# Patient Record
Sex: MALE | Race: WHITE | NOT HISPANIC OR LATINO
[De-identification: names, ages, dates, MRNs, and addresses within clinical notes are randomized per-mention and may not be internally consistent; named-entity substitution may affect disease eponyms.]

---

## 2023-07-04 ENCOUNTER — NON-APPOINTMENT (OUTPATIENT)
Age: 43
End: 2023-07-04

## 2023-07-05 PROBLEM — Z00.00 ENCOUNTER FOR PREVENTIVE HEALTH EXAMINATION: Status: ACTIVE | Noted: 2023-07-05

## 2023-07-12 ENCOUNTER — NON-APPOINTMENT (OUTPATIENT)
Age: 43
End: 2023-07-12

## 2023-07-12 ENCOUNTER — APPOINTMENT (OUTPATIENT)
Dept: FAMILY MEDICINE | Facility: CLINIC | Age: 43
End: 2023-07-12
Payer: COMMERCIAL

## 2023-07-12 ENCOUNTER — APPOINTMENT (OUTPATIENT)
Dept: ORTHOPEDIC SURGERY | Facility: CLINIC | Age: 43
End: 2023-07-12
Payer: COMMERCIAL

## 2023-07-12 VITALS
RESPIRATION RATE: 16 BRPM | HEART RATE: 91 BPM | HEIGHT: 70 IN | DIASTOLIC BLOOD PRESSURE: 80 MMHG | WEIGHT: 174 LBS | BODY MASS INDEX: 24.91 KG/M2 | SYSTOLIC BLOOD PRESSURE: 138 MMHG

## 2023-07-12 VITALS — BODY MASS INDEX: 25.77 KG/M2 | WEIGHT: 180 LBS | HEIGHT: 70 IN

## 2023-07-12 DIAGNOSIS — Z11.59 ENCOUNTER FOR SCREENING FOR OTHER VIRAL DISEASES: ICD-10-CM

## 2023-07-12 DIAGNOSIS — Z78.9 OTHER SPECIFIED HEALTH STATUS: ICD-10-CM

## 2023-07-12 DIAGNOSIS — M77.8 OTHER ENTHESOPATHIES, NOT ELSEWHERE CLASSIFIED: ICD-10-CM

## 2023-07-12 DIAGNOSIS — Z11.4 ENCOUNTER FOR SCREENING FOR HUMAN IMMUNODEFICIENCY VIRUS [HIV]: ICD-10-CM

## 2023-07-12 PROCEDURE — 99204 OFFICE O/P NEW MOD 45 MIN: CPT | Mod: 25

## 2023-07-12 PROCEDURE — 73130 X-RAY EXAM OF HAND: CPT | Mod: RT

## 2023-07-12 PROCEDURE — 20550 NJX 1 TENDON SHEATH/LIGAMENT: CPT | Mod: RT

## 2023-07-12 PROCEDURE — 73080 X-RAY EXAM OF ELBOW: CPT | Mod: RT

## 2023-07-12 NOTE — HISTORY OF PRESENT ILLNESS
[Sudden] : sudden [10] : 10 [Dull/Aching] : dull/aching [Radiating] : radiating [Constant] : constant [Sleep] : sleep [Nothing helps with pain getting better] : Nothing helps with pain getting better [de-identified] : 7/12/23: 43yo RHD male (william) presents for RIGHT wrist pain and swelling after a fall ~10 years ago. Reports that XR at that time were negative for fracture and MRI was inconclusive. Reports that pain improved with steroid injection and therapy, but has flares of pain and swelling about once a year. Reports that he has had 3 steroid injection, which relieved the pain. 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 Reports that swelling shifted from volar wrist to dorsal hand/wrist after taking medication.\par Denies personal/family history of autoimmune/inflammatory disorder. Denies h/o gout.\par Using Tylenol PRN.\par \par Hx: none. [] : no [FreeTextEntry5] : Rt Hand/Elbow pain/swelling that started 2 weeks ago. Suffered from fall years ago. Tried prednisone course; didn't work.  [FreeTextEntry7] : right arm

## 2023-07-12 NOTE — HISTORY OF PRESENT ILLNESS
[FreeTextEntry1] : Establish care [de-identified] : Mr. YANCY DURAN is a pleasant 42 year old male with no significant PMH who comes in to the office to establish care and for pain in his right wrist and elbow. his pain is intermittent, for about 2 weeks, denies redness, wounds, fever. He had had similar episodes in the past 10 years, 4 times already, has had some steroids oral. He saw ortho today and got a corticosteroid injection and was advised to see a rheumatologist. His symptoms are not worsening. Has not been diagnosed with gout or thyroid or other issues. He eats red meat almost every day. Drinks Smirnoff ice drinks 2 times/week but not beer.

## 2023-07-12 NOTE — HEALTH RISK ASSESSMENT
[2 - 3 times a week (3 pts)] : 2 - 3  times a week (3 points) [1 or 2 (0 pts)] : 1 or 2 (0 points) [Never (0 pts)] : Never (0 points) [Yes] : In the past 12 months have you used drugs other than those required for medical reasons? Yes [0] : 2) Feeling down, depressed, or hopeless: Not at all (0) [PHQ-2 Negative - No further assessment needed] : PHQ-2 Negative - No further assessment needed [HIV Test offered] : HIV Test offered [Hepatitis C test offered] : Hepatitis C test offered [Never] : Never [Audit-CScore] : 2 [de-identified] : marijuana use [JDW2Acbtc] : 0

## 2023-07-12 NOTE — PLAN
[FreeTextEntry1] : \par \par Right hand pain and swelling\par Unclear cause, could be gout vs other autoimmune vs others?\par Right elbow pain with minimal symptoms as noted during physical exam\par Low suspicious for infectious cause but he just got a steroids shot and is very inflamed. WIll give him Keflex\par Sending colchicine\par Xrays done with ortho and per their note, no acute fracture\par Ordering CBC, CMP, uric acid, MARIA TERESA, CCP, RF\par Referring to rheumatology\par Alarm symptoms discussed  including but not limited to worsening pain, redness, fever and I advised the patient to call 911 or to go to the emergency department if these symptoms appear. \par Hand/arm elevation and cold compresses advised\par \par Doing preventive lab work\par \par Will see ortho in 2 weeks\par Return to care: within 1 month for AWE/follow up or sooner should the need arise\par Call or return for any questions

## 2023-07-12 NOTE — PHYSICAL EXAM
[Normal] : normal rate, regular rhythm, normal S1 and S2 and no murmur heard [de-identified] : right hand wrapped, with swelling, No erythema or wound is in exposed skin. Right elbow tenderness but no abnormalities, wounds or erythema noted. Able to extend the elbow. Hand movements limited due to swelling and inflamation

## 2023-07-12 NOTE — IMAGING
[de-identified] : RIGHT ELBOW\par skin intact. no swelling.\par mild TTP to olecranon.\par elbow ROM: good extension, flexion. good pronation, supination. \par \par RIGHT HAND\par skin intact. moderate swelling of wrist and dorsal hand. no erythema.\par TTP diffusely to dorsal wrist.\par wrist ROM: deferred due to pain.\par good EPL, mild limited FPL. good finger extension, very limited flex. good finger abduction and adduction. \par + pain with wrist and digital ROM.\par SILT to median, ulnar, radial distribution. \par palpable radial pulse, brisk cap refill all digits.\par no triggering.\par \par \par XRAYS OF RIGHT ELBOW: no acute displaced fracture or dislocation.\par XRAYS OF RIGHT HAND: no acute displaced fracture or dislocation. djd of radiocarpal and LT joint.

## 2023-07-12 NOTE — ASSESSMENT
[FreeTextEntry1] : The condition was explained to the patient.\par \par Discussed that arthritis is a progressive degenerative process, and symptoms may have a waxing/waning course, which may be exacerbated by activity or trauma. Discussed treatment options - activity modification, bracing, steroid injection, or last resort surgery.\par Discussed increased propensity for stiffness due to underlying arthritis.\par \par Discussed my concern that hand stiffness can cause grave dysfunction and is difficult to overcome once it has set in. \par - encouraged HEP for finger flexion. \par - recommend wrist brace, full time except hygiene.\par - recommend compression glove and elevation of hand/wrist above level of heart for swelling.\par - recommend activity modification and ice PRN.\par \par Discussed risks and benefits of treatment options for tenosynovitis - activity modification, NSAID, splint, steroid injection, or surgery.\par \par Patient requesting CSI for R wrist pain and swelling.\par Patient would like to proceed with CSI.\par - Discussed risks, benefits, and alternatives as well as contents of injection. Risks include, but are not limited allergic reaction, flare reaction, injection site pain, bruising, numbness, increased blood sugar, skin discoloration, fat atrophy, tendon rupture, and infection. Risk of immune suppression and increased susceptibility to infection with steroid use. Patient expressed understanding and would like to proceed with injection.\par - The skin over the RIGHT 4th extensor compartment was cleansed with alcohol/betadine and anesthetized with 0.5cc of 1% lidocaine. The 4th extensor compartment was injected with 6mg of celestone. Site was dressed with a band-aid. Patient tolerated the procedure well.\par \par F/u 2 weeks.\par Also recommend f/u with Rheumatologist to evaluate for autoimmune/inflammatory disorder and gout. Patient reports that he is seeing his PCP today.

## 2023-07-17 LAB
ALBUMIN SERPL ELPH-MCNC: 4.7 G/DL
ALP BLD-CCNC: 85 U/L
ALT SERPL-CCNC: 12 U/L
ANA SER IF-ACNC: NEGATIVE
ANION GAP SERPL CALC-SCNC: 17 MMOL/L
AST SERPL-CCNC: 14 U/L
BILIRUB SERPL-MCNC: 0.7 MG/DL
BUN SERPL-MCNC: 10 MG/DL
CALCIUM SERPL-MCNC: 10.2 MG/DL
CCP AB SER IA-ACNC: <8 UNITS
CHLORIDE SERPL-SCNC: 96 MMOL/L
CO2 SERPL-SCNC: 26 MMOL/L
CREAT SERPL-MCNC: 1.02 MG/DL
EGFR: 94 ML/MIN/1.73M2
GLUCOSE SERPL-MCNC: 98 MG/DL
HCV AB SER QL: NONREACTIVE
HCV S/CO RATIO: 0.09 S/CO
HIV1+2 AB SPEC QL IA.RAPID: NONREACTIVE
POTASSIUM SERPL-SCNC: 4.3 MMOL/L
PROT SERPL-MCNC: 7.6 G/DL
RF+CCP IGG SER-IMP: NEGATIVE
RHEUMATOID FACT SER QL: 12 IU/ML
SODIUM SERPL-SCNC: 139 MMOL/L
TSH SERPL-ACNC: 0.68 UIU/ML
URATE SERPL-MCNC: 11.4 MG/DL

## 2023-07-20 ENCOUNTER — NON-APPOINTMENT (OUTPATIENT)
Age: 43
End: 2023-07-20

## 2023-07-21 ENCOUNTER — NON-APPOINTMENT (OUTPATIENT)
Age: 43
End: 2023-07-21

## 2023-08-09 ENCOUNTER — APPOINTMENT (OUTPATIENT)
Dept: FAMILY MEDICINE | Facility: CLINIC | Age: 43
End: 2023-08-09
Payer: COMMERCIAL

## 2023-08-09 VITALS
DIASTOLIC BLOOD PRESSURE: 85 MMHG | WEIGHT: 180 LBS | SYSTOLIC BLOOD PRESSURE: 124 MMHG | HEART RATE: 49 BPM | BODY MASS INDEX: 26.66 KG/M2 | HEIGHT: 69 IN

## 2023-08-09 PROCEDURE — 99214 OFFICE O/P EST MOD 30 MIN: CPT

## 2023-08-09 RX ORDER — CEPHALEXIN 500 MG/1
500 TABLET ORAL EVERY 6 HOURS
Qty: 20 | Refills: 0 | Status: DISCONTINUED | COMMUNITY
Start: 2023-07-12 | End: 2023-08-09

## 2023-08-09 NOTE — PLAN
[FreeTextEntry1] :  Right hand pain and swelling Likely gout  Negative CCP, MARIA TERESA, RF  Uric acid was 11.4 (07/12/2023) Start allopurinol 100 mg QD and repeat uric acid within 6 weeks Elbow pain resolved, w/rist pain much improved. Ordering official Xrays in case he needs an MRI in the future. He will see ortho within 2 weeks  Renewing colchicine Xrays done with ortho and per their note, no acute fracture. Was referred to rheumatology. Alarm symptoms discussed including but not limited to worsening pain, redness, fever and I advised the patient to call 911 or to go to the emergency department if these symptoms appear.  Hand/arm elevation and cold compresses advised Will see ortho within 2 weeks Can try voltaren gel  Return to care: within 1 month for AWE/follow up or sooner should the need arise Call or return for any questions

## 2023-08-09 NOTE — HEALTH RISK ASSESSMENT
[0] : 2) Feeling down, depressed, or hopeless: Not at all (0) [PHQ-2 Negative - No further assessment needed] : PHQ-2 Negative - No further assessment needed [[id number]] : 0

## 2023-08-09 NOTE — PHYSICAL EXAM
[Normal] : normal rate, regular rhythm, normal S1 and S2 and no murmur heard [de-identified] : mild tenderness in interior aspect of right hand. No erythema or wound is in exposed skin. Right elbow  with no tenderness. Overall symptoms much better and no wounds.  [de-identified] : normal strenght and sensation of bot hands

## 2023-08-09 NOTE — HISTORY OF PRESENT ILLNESS
[FreeTextEntry1] : Follow up [de-identified] : Mr. YANCY DURAN is a pleasant 42 year old male with no significant PMH who comes in to the office to follow up in pain in his right wrist and elbow. His symptoms are better now, after he finished the Colchicine x 2. No pain in his elbow ant this time. His uric acid was elevated. Also he sees hand surgery Dr Holt and will follow up on 08/23/2023  He has been cutting down red meat. Drinks Smirnoff ice drinks 2 times/week but not beer  Hand Surgery: Jonathon

## 2023-08-23 ENCOUNTER — APPOINTMENT (OUTPATIENT)
Dept: ORTHOPEDIC SURGERY | Facility: CLINIC | Age: 43
End: 2023-08-23
Payer: COMMERCIAL

## 2023-08-23 VITALS — BODY MASS INDEX: 26.66 KG/M2 | HEIGHT: 69 IN | WEIGHT: 180 LBS

## 2023-08-23 DIAGNOSIS — M25.531 PAIN IN RIGHT WRIST: ICD-10-CM

## 2023-08-23 DIAGNOSIS — M25.521 PAIN IN RIGHT ELBOW: ICD-10-CM

## 2023-08-23 DIAGNOSIS — M19.039 PRIMARY OSTEOARTHRITIS, UNSPECIFIED WRIST: ICD-10-CM

## 2023-08-23 DIAGNOSIS — M1A.00X0 IDIOPATHIC CHRONIC GOUT, UNSPECIFIED SITE, W/OUT TOPHUS (TOPHI): ICD-10-CM

## 2023-08-23 PROCEDURE — 99213 OFFICE O/P EST LOW 20 MIN: CPT

## 2023-08-23 NOTE — HISTORY OF PRESENT ILLNESS
[Sudden] : sudden [10] : 10 [Dull/Aching] : dull/aching [Radiating] : radiating [Constant] : constant [Sleep] : sleep [Nothing helps with pain getting better] : Nothing helps with pain getting better [de-identified] : 8/23/23: f/u RIGHT wrist arthritis, tendinitis, pain. f/u RIGHT elbow pain. s/p CSI 4th extensor compartment on 7/12/23. Since last visit, saw PCP => labs +gout, prescribed Colchicine and Allopurinol. Reports pain and swelling significantly improved since last visit. c/o intermittent volar radial wrist pain, worse with push-up.  7/12/23: 41yo RHD male (william) presents for RIGHT wrist pain and swelling after a fall ~10 years ago. Reports that XR at that time were negative for fracture and MRI was inconclusive. Reports that pain improved with steroid injection and therapy but has flares of pain and swelling about once a year. Reports that he has had 3 steroid injections, which relieved the pain. 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 Has worn wrist brace intermittently, but unable to tolerate it at this time. Went to [facility] on 7/5/23 => Dx gout => Rx prednisone 20mg x 7 days. Reports that swelling shifted from volar wrist to dorsal hand/wrist after taking medication. Denies personal/family history of autoimmune/inflammatory disorder. Denies h/o gout. Using Tylenol PRN.  Hx: none. [] : no [FreeTextEntry5] : Rt Hand/Elbow pain/swelling that started 2 weeks ago. Suffered from fall years ago. Tried prednisone course; didn't work.  [FreeTextEntry7] : right arm

## 2023-08-23 NOTE — IMAGING
[de-identified] : RIGHT ELBOW skin intact. no swelling. no TTP. elbow ROM: good extension, flexion. good pronation, supination.   RIGHT HAND skin intact. mild swelling of volar radial wrist. no TTP. wrist ROM: very limited extension, flexion. good EPL, FPL. good finger extension, flex to full fist. no scissoring. good finger abduction, adduction.  SILT to median, ulnar, radial distribution.  palpable radial pulse, brisk cap refill all digits. no triggering.

## 2023-09-27 ENCOUNTER — APPOINTMENT (OUTPATIENT)
Dept: RHEUMATOLOGY | Facility: CLINIC | Age: 43
End: 2023-09-27

## 2023-10-11 ENCOUNTER — APPOINTMENT (OUTPATIENT)
Dept: FAMILY MEDICINE | Facility: CLINIC | Age: 43
End: 2023-10-11
Payer: SELF-PAY

## 2023-10-11 VITALS
HEIGHT: 69 IN | WEIGHT: 180 LBS | BODY MASS INDEX: 26.66 KG/M2 | SYSTOLIC BLOOD PRESSURE: 110 MMHG | DIASTOLIC BLOOD PRESSURE: 80 MMHG

## 2023-10-11 DIAGNOSIS — D72.829 ELEVATED WHITE BLOOD CELL COUNT, UNSPECIFIED: ICD-10-CM

## 2023-10-11 DIAGNOSIS — Z71.2 PERSON CONSULTING FOR EXPLANATION OF EXAMINATION OR TEST FINDINGS: ICD-10-CM

## 2023-10-11 DIAGNOSIS — Z23 ENCOUNTER FOR IMMUNIZATION: ICD-10-CM

## 2023-10-11 PROCEDURE — 99214 OFFICE O/P EST MOD 30 MIN: CPT | Mod: 25

## 2023-10-11 RX ORDER — COLCHICINE 0.6 MG/1
0.6 TABLET ORAL
Qty: 14 | Refills: 1 | Status: ACTIVE | COMMUNITY
Start: 2023-07-12 | End: 1900-01-01

## 2023-10-12 LAB
BASOPHILS # BLD AUTO: 0.05 K/UL
BASOPHILS NFR BLD AUTO: 0.7 %
EOSINOPHIL # BLD AUTO: 0.26 K/UL
EOSINOPHIL NFR BLD AUTO: 3.9 %
HCT VFR BLD CALC: 45.3 %
HGB BLD-MCNC: 14.6 G/DL
IMM GRANULOCYTES NFR BLD AUTO: 0.1 %
LYMPHOCYTES # BLD AUTO: 3.65 K/UL
LYMPHOCYTES NFR BLD AUTO: 54.1 %
MAN DIFF?: NORMAL
MCHC RBC-ENTMCNC: 28.9 PG
MCHC RBC-ENTMCNC: 32.2 GM/DL
MCV RBC AUTO: 89.7 FL
MONOCYTES # BLD AUTO: 0.57 K/UL
MONOCYTES NFR BLD AUTO: 8.4 %
NEUTROPHILS # BLD AUTO: 2.21 K/UL
NEUTROPHILS NFR BLD AUTO: 32.8 %
PLATELET # BLD AUTO: 297 K/UL
RBC # BLD: 5.05 M/UL
RBC # FLD: 13.8 %
URATE SERPL-MCNC: 6.5 MG/DL
WBC # FLD AUTO: 6.75 K/UL

## 2024-01-23 ENCOUNTER — APPOINTMENT (OUTPATIENT)
Dept: FAMILY MEDICINE | Facility: CLINIC | Age: 44
End: 2024-01-23
Payer: COMMERCIAL

## 2024-01-23 VITALS
RESPIRATION RATE: 16 BRPM | HEIGHT: 69 IN | HEART RATE: 77 BPM | DIASTOLIC BLOOD PRESSURE: 65 MMHG | BODY MASS INDEX: 26.66 KG/M2 | SYSTOLIC BLOOD PRESSURE: 111 MMHG | WEIGHT: 180 LBS

## 2024-01-23 DIAGNOSIS — E66.3 OVERWEIGHT: ICD-10-CM

## 2024-01-23 DIAGNOSIS — M10.9 GOUT, UNSPECIFIED: ICD-10-CM

## 2024-01-23 DIAGNOSIS — R79.89 OTHER SPECIFIED ABNORMAL FINDINGS OF BLOOD CHEMISTRY: ICD-10-CM

## 2024-01-23 PROCEDURE — 99214 OFFICE O/P EST MOD 30 MIN: CPT | Mod: 25

## 2024-01-23 NOTE — HISTORY OF PRESENT ILLNESS
[FreeTextEntry1] : Follow up [de-identified] : Mr. YANCY DURAN is a pleasant 43-year-old male with no significant PMH who comes into the office to follow up in gout/hyperuricemia.  Last flare up was > 2 months ago. Patient is taking his allopurinol. He would like a 2nd opinion with hand surgery.   Hand Surgery: Jonathon

## 2024-01-23 NOTE — HEALTH RISK ASSESSMENT
[0] : 2) Feeling down, depressed, or hopeless: Not at all (0) [PHQ-2 Negative - No further assessment needed] : PHQ-2 Negative - No further assessment needed [[id number]] : 0

## 2024-01-23 NOTE — PLAN
[FreeTextEntry1] :  Hyperuricemia and gout Negative CCP, MARIA TERESA, RF per records from Jul/2023 Uric acid was 11.4 (07/12/2023) and down to 6.5 in Oct/12/2023.Repeat uric acid today. Repeat CBC, had some lymphocytosis on Oct/2023 Continue allopurinol 100 mg QD and Renewing his colchicine which he will take only as needed for gout symptoms. Was previously referred to rheumatology, referring again. Alarm symptoms discussed including but not limited to worsening pain, redness, fever and I advised the patient to call 911 or to go to the emergency department if these symptoms appear.  Follow up with ortho, referring to 2nd opinion per patient's request. Can try Voltaren gel.  Oveerweight Check lipids  Return to care: within 1 month for AWE/follow up or sooner should the need arise. Call or return for any questions.

## 2024-01-25 LAB
ANION GAP SERPL CALC-SCNC: 10 MMOL/L
BUN SERPL-MCNC: 14 MG/DL
CALCIUM SERPL-MCNC: 9.4 MG/DL
CHLORIDE SERPL-SCNC: 103 MMOL/L
CHOLEST SERPL-MCNC: 137 MG/DL
CO2 SERPL-SCNC: 25 MMOL/L
CREAT SERPL-MCNC: 1.05 MG/DL
EGFR: 90 ML/MIN/1.73M2
GLUCOSE SERPL-MCNC: 105 MG/DL
HDLC SERPL-MCNC: 54 MG/DL
LDLC SERPL CALC-MCNC: 62 MG/DL
NONHDLC SERPL-MCNC: 83 MG/DL
POTASSIUM SERPL-SCNC: 4.1 MMOL/L
SODIUM SERPL-SCNC: 138 MMOL/L
TRIGL SERPL-MCNC: 116 MG/DL
URATE SERPL-MCNC: 5.1 MG/DL

## 2024-01-31 ENCOUNTER — NON-APPOINTMENT (OUTPATIENT)
Age: 44
End: 2024-01-31

## 2024-01-31 ENCOUNTER — APPOINTMENT (OUTPATIENT)
Dept: ORTHOPEDIC SURGERY | Facility: CLINIC | Age: 44
End: 2024-01-31
Payer: COMMERCIAL

## 2024-01-31 DIAGNOSIS — M62.838 OTHER MUSCLE SPASM: ICD-10-CM

## 2024-01-31 DIAGNOSIS — M18.11 UNILATERAL PRIMARY OSTEOARTHRITIS OF FIRST CARPOMETACARPAL JOINT, RIGHT HAND: ICD-10-CM

## 2024-01-31 PROCEDURE — 99204 OFFICE O/P NEW MOD 45 MIN: CPT

## 2024-01-31 RX ORDER — DICLOFENAC SODIUM 1% 10 MG/G
1 GEL TOPICAL
Qty: 1 | Refills: 0 | Status: ACTIVE | COMMUNITY
Start: 2024-01-31 | End: 1900-01-01

## 2024-01-31 NOTE — HISTORY OF PRESENT ILLNESS
[de-identified] : 43M who presents for R wrist pain x10yrs. Pain has been stable, flares up once/year, lasting a week or month. When flare, Aleve and ice alleviated the pain. 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 Patient works in a [employer]. Hx of multiple steroid injections on wrist, which patient states helps. -Xay of wrist last year reportedly show arthritis.  Of note, also complains of neck pain x3-4yrs. Pain mostly occurs when patient wakes up in the morning. Aleve and massages alleviated the pain.  Patient states he has previous x-rays and MRI of his neck, but does not bring any records with him today.  Currently he has no numbness, no tingling, no radiating pain.  No recent workup or treatment.

## 2024-01-31 NOTE — PHYSICAL EXAM
[de-identified] : Constitutional: Well-nourished, well-developed, No acute distress Respiratory:  Good respiratory effort, no SOB Psychiatric: Pleasant and normal affect, alert and oriented x3 Musculoskeletal: normal except where as noted in regional exam  Cervical Spine Exam APPEARANCE: no marked deformities or malalignment, normal curvature, good posture POSITIVE TENDERNESS: + Bilateral upper trapezius, levator scapula, rhomboid major, and rhomboid minor, + spasm noted in the same muscles. NONTENDER: no bony midline tenderness. ROM: limited in all planes, most notably in flexion and sidebending due to pain RESISTIVE TESTING: painful 4/5 resisted ext, bilateral sidebending, rotation and shoulder shrug , 5/5 flexion  SPECIAL TESTS: neg Spurling's b/l Vasc: 2+ radial pulse b/l Neuro: C5 - T1 intact to motor, DTRs 2+/4 biceps, triceps, brachioradialis Sensation: Intact to light touch throughout b/l UE  Thoracic Spine Exam:  normal curvature and normal alignment. good posture. no midline bony tenderness, + marked spasm and associated tenderness of bilateral paraspinal and periscapular muscles.  ROM mildly limited in sidebending and rotation due to noted spasm  Right hand: APPEARANCE: No swelling, no marked deformities or malalignment of the fingers POSITIVE TENDERNESS: + 1st CMC joint NONTENDER: all other osseous and ligamentous structures.  ROM: + Crepitus and mild limitation of the first CMC joint, otherwise full & painless in CMC, MCP, and IP joints.  RESISTIVE TESTING: painless resisted testing.  SPECIAL TESTS: normal sensation of 1st through 5th digits, normal flex/ext, abd/add, and opposition of thumb, no triggering of fingers Vasc: 2+ radial pulse, normal capillary refill Neuro: AIN, PIN, Ulnar nerve intact to motor Sensation: Intact to light touch throughout

## 2024-01-31 NOTE — CONSULT LETTER
[Dear  ___] : Dear  [unfilled], [Consult Letter:] : I had the pleasure of evaluating your patient, [unfilled]. [Please see my note below.] : Please see my note below. [Sincerely,] : Sincerely, [FreeTextEntry2] : PCP - Dr. Mancilla [id number], [place] [phone] [FreeTextEntry3] : Mandeep Cody DO, ATC Primary [facility] Sports [facility] [place]

## 2024-01-31 NOTE — DISCUSSION/SUMMARY
[de-identified] : Discussed findings of today's exam and possible causes of patient's pain.  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  Reviewed possible courses of treatment, and we collaboratively decided best course of treatment at this time will include conservative management.  Patient is right-hand dominant and has been working at a [employer] for many  years, this repeated use of the hand can lead to some early arthritis.  Patient has pain localized to the CMC joint.  Patient advised to  over-the-counter thumb spica brace to be worn in the evening while he is sleeping, and as tolerated during the day for support.  Patient started on a course of topical NSAIDs, prescription given for diclofenac gel 1% to be applied to the area of pain 2-3 times daily as needed (We discussed all possible side effects of this medication).  Patient has had good response to cortisone injections in the past, if he has persisting pain despite these conservative measures may consider cortisone injection at that time. Patient was also seen today for evaluation and management of chronic intermittent neck pain for the last 3-4 years with recent atraumatic exacerbation.  Patient primarily has postural related myofascial spasm and resultant pain.  He has no radicular signs or symptoms.  Patient has had x-rays and MRI in the past, no imaging needed at this time as he has no radicular signs or symptoms or other red flag warning signs or symptoms.  He is not candidate for any epidural injections currently, no need for MRI currently.  Patient will be started on a course of physical therapy to restore normal range of motion and strength as tolerated.   Follow up as needed.  Patient appreciates and agrees with current plan.  I work as part of an academic orthopedic group and routinely have a physician in training (resident / fellow) working with me.  Any part of the history and physical exam performed by the physician in training was either directly reviewed and/or replicated by myself.    This note was generated using dragon medical dictation software.  A reasonable effort has been made for proofreading its contents, but typos may still remain.  If there are any questions or points of clarification needed please notify my office.

## 2024-02-09 ENCOUNTER — RX RENEWAL (OUTPATIENT)
Age: 44
End: 2024-02-09

## 2024-05-09 ENCOUNTER — APPOINTMENT (OUTPATIENT)
Dept: FAMILY MEDICINE | Facility: CLINIC | Age: 44
End: 2024-05-09

## 2024-06-05 ENCOUNTER — APPOINTMENT (OUTPATIENT)
Dept: INTERNAL MEDICINE | Facility: CLINIC | Age: 44
End: 2024-06-05
Payer: COMMERCIAL

## 2024-06-05 VITALS
BODY MASS INDEX: 26.66 KG/M2 | DIASTOLIC BLOOD PRESSURE: 77 MMHG | HEIGHT: 69 IN | SYSTOLIC BLOOD PRESSURE: 123 MMHG | WEIGHT: 180 LBS | HEART RATE: 73 BPM

## 2024-06-05 DIAGNOSIS — R73.09 OTHER ABNORMAL GLUCOSE: ICD-10-CM

## 2024-06-05 DIAGNOSIS — E79.0 HYPERURICEMIA W/OUT SIGNS OF INFLAMMATORY ARTHRITIS AND TOPHACEOUS DISEASE: ICD-10-CM

## 2024-06-05 PROCEDURE — G2211 COMPLEX E/M VISIT ADD ON: CPT

## 2024-06-05 PROCEDURE — 99214 OFFICE O/P EST MOD 30 MIN: CPT

## 2024-06-05 RX ORDER — ALLOPURINOL 100 MG/1
100 TABLET ORAL
Qty: 90 | Refills: 1 | Status: ACTIVE | COMMUNITY
Start: 2023-08-09 | End: 1900-01-01

## 2024-06-05 NOTE — HISTORY OF PRESENT ILLNESS
[FreeTextEntry1] : Follow up [de-identified] : Mr. YANCY DURAN is a pleasant 43-year-old male with PMH of gout/hyperuricemia who comes into the office to follow up in gout/hyperuricemia.  Last flare up was > 6 months ago. Patient is taking his allopurinol, tolerating well but would like to come off it.  Hand Surgery: Jonathon

## 2024-06-05 NOTE — PLAN
[FreeTextEntry1] :  Hyperuricemia and gout Continue allopurinol 100 mg QD He will try to decrease the dose 4-6 weeks before his next appointment, form 100 mg QD to 50 mg QD ort 100 mg every other day Colchicine and Voltaren gel as needed for flare ups Overweight Check lipids Avoid red meats, seafood, alcohol Increase hydration  Abnormal glucose in the EMR Check BMP, A1C Diet, exercise  Return to care: within 6 months for follow up or sooner should the need arise. Call or return for any questions.

## 2024-06-05 NOTE — HEALTH RISK ASSESSMENT
[0] : 2) Feeling down, depressed, or hopeless: Not at all (0) [PHQ-2 Negative - No further assessment needed] : PHQ-2 Negative - No further assessment needed [Former] : Former [10-14] : 10-14 [> 15 Years] : > 15 Years [[id number]] : 0

## 2024-12-04 ENCOUNTER — APPOINTMENT (OUTPATIENT)
Dept: INTERNAL MEDICINE | Facility: CLINIC | Age: 44
End: 2024-12-04
Payer: COMMERCIAL

## 2024-12-04 VITALS
HEIGHT: 69 IN | OXYGEN SATURATION: 98 % | HEART RATE: 74 BPM | BODY MASS INDEX: 29.33 KG/M2 | DIASTOLIC BLOOD PRESSURE: 62 MMHG | WEIGHT: 198 LBS | SYSTOLIC BLOOD PRESSURE: 110 MMHG

## 2024-12-04 DIAGNOSIS — M25.531 PAIN IN RIGHT WRIST: ICD-10-CM

## 2024-12-04 DIAGNOSIS — M10.9 GOUT, UNSPECIFIED: ICD-10-CM

## 2024-12-04 DIAGNOSIS — E79.0 HYPERURICEMIA W/OUT SIGNS OF INFLAMMATORY ARTHRITIS AND TOPHACEOUS DISEASE: ICD-10-CM

## 2024-12-04 PROCEDURE — 99214 OFFICE O/P EST MOD 30 MIN: CPT

## 2025-08-15 ENCOUNTER — APPOINTMENT (OUTPATIENT)
Dept: INTERNAL MEDICINE | Facility: CLINIC | Age: 45
End: 2025-08-15

## 2025-08-15 VITALS
BODY MASS INDEX: 28.65 KG/M2 | DIASTOLIC BLOOD PRESSURE: 90 MMHG | HEART RATE: 93 BPM | WEIGHT: 194 LBS | SYSTOLIC BLOOD PRESSURE: 134 MMHG | OXYGEN SATURATION: 97 %

## 2025-08-15 DIAGNOSIS — E79.0 HYPERURICEMIA W/OUT SIGNS OF INFLAMMATORY ARTHRITIS AND TOPHACEOUS DISEASE: ICD-10-CM

## 2025-08-15 DIAGNOSIS — M10.9 GOUT, UNSPECIFIED: ICD-10-CM

## 2025-08-15 PROCEDURE — G2211 COMPLEX E/M VISIT ADD ON: CPT | Mod: NC

## 2025-08-15 PROCEDURE — 36415 COLL VENOUS BLD VENIPUNCTURE: CPT

## 2025-08-15 PROCEDURE — 99213 OFFICE O/P EST LOW 20 MIN: CPT

## 2025-08-20 LAB
ANION GAP SERPL CALC-SCNC: 14 MMOL/L
BASOPHILS # BLD AUTO: 0.05 K/UL
BASOPHILS NFR BLD AUTO: 0.7 %
BUN SERPL-MCNC: 9 MG/DL
CALCIUM SERPL-MCNC: 10.2 MG/DL
CHLORIDE SERPL-SCNC: 103 MMOL/L
CO2 SERPL-SCNC: 24 MMOL/L
CREAT SERPL-MCNC: 1.02 MG/DL
EGFRCR SERPLBLD CKD-EPI 2021: 92 ML/MIN/1.73M2
EOSINOPHIL # BLD AUTO: 0.16 K/UL
EOSINOPHIL NFR BLD AUTO: 2.2 %
GLUCOSE SERPL-MCNC: 103 MG/DL
HCT VFR BLD CALC: 47.4 %
HGB BLD-MCNC: 15.6 G/DL
IMM GRANULOCYTES NFR BLD AUTO: 0.3 %
LYMPHOCYTES # BLD AUTO: 2.87 K/UL
LYMPHOCYTES NFR BLD AUTO: 39.4 %
MAN DIFF?: NORMAL
MCHC RBC-ENTMCNC: 29.5 PG
MCHC RBC-ENTMCNC: 32.9 G/DL
MCV RBC AUTO: 89.8 FL
MONOCYTES # BLD AUTO: 0.43 K/UL
MONOCYTES NFR BLD AUTO: 5.9 %
NEUTROPHILS # BLD AUTO: 3.76 K/UL
NEUTROPHILS NFR BLD AUTO: 51.5 %
PLATELET # BLD AUTO: 275 K/UL
POTASSIUM SERPL-SCNC: 4.4 MMOL/L
RBC # BLD: 5.28 M/UL
RBC # FLD: 13.5 %
SODIUM SERPL-SCNC: 141 MMOL/L
URATE SERPL-MCNC: 6.7 MG/DL
WBC # FLD AUTO: 7.29 K/UL